# Patient Record
Sex: FEMALE | Race: WHITE | NOT HISPANIC OR LATINO | Employment: FULL TIME | URBAN - METROPOLITAN AREA
[De-identification: names, ages, dates, MRNs, and addresses within clinical notes are randomized per-mention and may not be internally consistent; named-entity substitution may affect disease eponyms.]

---

## 2021-04-27 ENCOUNTER — OFFICE VISIT (OUTPATIENT)
Dept: FAMILY MEDICINE CLINIC | Facility: CLINIC | Age: 21
End: 2021-04-27
Payer: COMMERCIAL

## 2021-04-27 VITALS
BODY MASS INDEX: 33.03 KG/M2 | HEART RATE: 88 BPM | OXYGEN SATURATION: 98 % | SYSTOLIC BLOOD PRESSURE: 144 MMHG | TEMPERATURE: 97.9 F | DIASTOLIC BLOOD PRESSURE: 70 MMHG | HEIGHT: 66 IN | WEIGHT: 205.5 LBS

## 2021-04-27 DIAGNOSIS — S13.9XXA NECK SPRAIN, INITIAL ENCOUNTER: ICD-10-CM

## 2021-04-27 DIAGNOSIS — Z00.00 ANNUAL PHYSICAL EXAM: Primary | ICD-10-CM

## 2021-04-27 PROBLEM — L71.9 ROSACEA: Status: ACTIVE | Noted: 2021-04-27

## 2021-04-27 PROBLEM — Z86.16 HISTORY OF COVID-19: Status: ACTIVE | Noted: 2021-04-27

## 2021-04-27 PROBLEM — J45.30 MILD PERSISTENT ASTHMA WITHOUT COMPLICATION: Status: ACTIVE | Noted: 2021-04-27

## 2021-04-27 PROBLEM — Z30.41 ORAL CONTRACEPTIVE PILL SURVEILLANCE: Status: ACTIVE | Noted: 2021-04-27

## 2021-04-27 PROBLEM — F41.9 ANXIETY: Status: ACTIVE | Noted: 2021-04-27

## 2021-04-27 PROBLEM — Z98.890 HISTORY OF BILATERAL BREAST REDUCTION SURGERY: Status: ACTIVE | Noted: 2021-04-27

## 2021-04-27 PROBLEM — Z98.890 HISTORY OF SINUS SURGERY: Status: ACTIVE | Noted: 2021-04-27

## 2021-04-27 PROBLEM — J45.909 ASTHMA: Status: ACTIVE | Noted: 2021-04-27

## 2021-04-27 PROBLEM — L71.9 ROSACEA: Status: RESOLVED | Noted: 2021-04-27 | Resolved: 2021-04-27

## 2021-04-27 PROCEDURE — 99385 PREV VISIT NEW AGE 18-39: CPT | Performed by: FAMILY MEDICINE

## 2021-04-27 PROCEDURE — 3725F SCREEN DEPRESSION PERFORMED: CPT | Performed by: FAMILY MEDICINE

## 2021-04-27 PROCEDURE — 3008F BODY MASS INDEX DOCD: CPT | Performed by: FAMILY MEDICINE

## 2021-04-27 PROCEDURE — 1036F TOBACCO NON-USER: CPT | Performed by: FAMILY MEDICINE

## 2021-04-27 RX ORDER — SULFAMETHOXAZOLE AND TRIMETHOPRIM 800; 160 MG/1; MG/1
TABLET ORAL
COMMUNITY
Start: 2021-03-23 | End: 2021-04-27

## 2021-04-27 RX ORDER — CEPHALEXIN 500 MG/1
500 CAPSULE ORAL 4 TIMES DAILY
COMMUNITY
Start: 2021-03-29 | End: 2021-04-27

## 2021-04-27 RX ORDER — NORETHINDRONE ACETATE AND ETHINYL ESTRADIOL, AND FERROUS FUMARATE 1MG-20(24)
KIT ORAL DAILY
COMMUNITY
Start: 2021-03-23

## 2021-04-27 RX ORDER — FLUCONAZOLE 150 MG/1
TABLET ORAL
COMMUNITY
Start: 2021-03-29 | End: 2021-04-27

## 2021-04-27 RX ORDER — PROPRANOLOL HYDROCHLORIDE 20 MG/1
20 TABLET ORAL 2 TIMES DAILY
COMMUNITY
Start: 2021-03-26 | End: 2021-04-27

## 2021-04-27 RX ORDER — TOPIRAMATE 25 MG/1
TABLET ORAL
COMMUNITY
Start: 2021-03-26 | End: 2021-04-27

## 2021-04-27 RX ORDER — NORETHINDRONE ACETATE AND ETHINYL ESTRADIOL, AND FERROUS FUMARATE 1MG-20(24)
KIT ORAL
COMMUNITY
End: 2021-04-27

## 2021-04-27 RX ORDER — ALBUTEROL SULFATE 90 UG/1
2 AEROSOL, METERED RESPIRATORY (INHALATION)
COMMUNITY

## 2021-04-27 NOTE — PROGRESS NOTES
[facility] PRACTICE    NAME: Parviz Dockery  AGE: 24 y o  SEX: female  : 2000     DATE: 2021     Assessment and Plan:     Problem List Items Addressed This Visit     None      Visit Diagnoses     Annual physical exam    -  Primary    Neck sprain, initial encounter        Relevant Orders    Ambulatory referral to Physical Therapy          Immunizations and preventive care screenings were discussed with patient today  Appropriate education was printed on patient's after visit summary  Counseling:  Dental Health: discussed importance of regular tooth brushing, flossing, and dental visits  · Exercise: the importance of regular exercise/physical activity was discussed  Recommend exercise 3-5 times per week for at least 30 minutes  BMI Counseling: Body mass index is 32 92 kg/m²  The BMI is above normal  Exercise recommendations include exercising 3-5 times per week  Return in 1 year (on 2022)  Chief Complaint:     Chief Complaint   Patient presents with    Annual Exam     pt here physical      History of Present Illness:     Adult Annual Physical   Patient here for a comprehensive physical exam  The patient reports no problems  Diet and Physical Activity  · Diet/Nutrition: well balanced diet  · Exercise: walking  Depression Screening  PHQ-9 Depression Screening    PHQ-9:   Frequency of the following problems over the past two weeks:      Little interest or pleasure in doing things: 0 - not at all  Feeling down, depressed, or hopeless: 0 - not at all  PHQ-2 Score: 0       General Health  · Sleep: sleeps well  · Hearing: normal - bilateral   · Vision: no vision problems  · Dental: regular dental visits  /GYN Health  · Contraceptive method: oral contraceptives  Does not really get periods  Review of Systems:     Review of Systems   Constitutional: Positive for fatigue   Negative for chills, fever and unexpected weight change  HENT: Positive for congestion  Negative for ear pain, hearing loss, postnasal drip, rhinorrhea and sore throat  Eyes: Negative for itching and visual disturbance  Respiratory: Negative for cough and shortness of breath  Some SOB   Cardiovascular: Negative for chest pain  Gastrointestinal: Negative for abdominal pain, constipation and diarrhea  Genitourinary: Negative for difficulty urinating  Musculoskeletal: Positive for myalgias (neck pain)  Negative for arthralgias and gait problem  Skin: Negative for rash  Neurological: Negative for light-headedness and headaches (has migraines, related to neck muscle spasm)  Psychiatric/Behavioral: Negative for dysphoric mood and sleep disturbance  The patient is nervous/anxious         Past Medical History:     Past Medical History:   Diagnosis Date    Anxiety     Asthma     Depression     Sinusitis       Past Surgical History:     Past Surgical History:   Procedure Laterality Date    BREAST SURGERY      SINUS SURGERY        Social History:        Social History     Socioeconomic History    Marital status: Single     Spouse name: None    Number of children: None    Years of education: None    Highest education level: None   Occupational History    None   Social Needs    Financial resource strain: None    Food insecurity     Worry: None     Inability: None    Transportation needs     Medical: None     Non-medical: None   Tobacco Use    Smoking status: Never Smoker    Smokeless tobacco: Never Used   Substance and Sexual Activity    Alcohol use: Never     Alcohol/week: 0 0 standard drinks     Frequency: Never     Binge frequency: Never    Drug use: Never    Sexual activity: None   Lifestyle    Physical activity     Days per week: None     Minutes per session: None    Stress: None   Relationships    Social connections     Talks on phone: None     Gets together: None     Attends Spiritism service: None     Active member of club or organization: None     Attends meetings of clubs or organizations: None     Relationship status: None    Intimate partner violence     Fear of current or ex partner: None     Emotionally abused: None     Physically abused: None     Forced sexual activity: None   Other Topics Concern    None   Social History Narrative    None      Family History:     Family History   Problem Relation Age of Onset    Asthma Mother     Cancer Mother     Asthma Maternal Grandmother     Cancer Maternal Grandfather       Current Medications:     Current Outpatient Medications   Medication Sig Dispense Refill    albuterol (PROVENTIL HFA,VENTOLIN HFA) 90 mcg/act inhaler Inhale 2 puffs      Fluticasone Furoate-Vilanterol (BREO ELLIPTA IN) Inhale      Norethin Ace-Eth Estrad-FE 1-20 MG-MCG(24) CAPS Take by mouth daily       No current facility-administered medications for this visit  Allergies:     No Known Allergies   Physical Exam:     /70   Pulse 88   Temp 97 9 °F (36 6 °C)   Ht 5' 6 25" (1 683 m)   Wt 93 2 kg (205 lb 8 oz)   SpO2 98%   BMI 32 92 kg/m²     Physical Exam  Constitutional:       Appearance: She is well-developed  HENT:      Head: Normocephalic and atraumatic  Right Ear: Tympanic membrane, ear canal and external ear normal       Left Ear: Tympanic membrane, ear canal and external ear normal    Eyes:      Conjunctiva/sclera: Conjunctivae normal    Neck:      Musculoskeletal: Normal range of motion and neck supple  Cardiovascular:      Rate and Rhythm: Normal rate and regular rhythm  Heart sounds: Normal heart sounds  No murmur  Pulmonary:      Effort: Pulmonary effort is normal  No respiratory distress  Breath sounds: Normal breath sounds  Abdominal:      General: Bowel sounds are normal  There is no distension  Palpations: Abdomen is soft  Tenderness: There is no abdominal tenderness  Musculoskeletal: Normal range of motion  Skin:     General: Skin is warm and dry  Findings: No rash  Neurological:      General: No focal deficit present  Mental Status: She is alert and oriented to person, place, and time  Psychiatric:         Mood and Affect: Mood normal          Behavior: Behavior normal          Thought Content:  Thought content normal          Judgment: Judgment normal           MD Aureliano Simeon

## 2021-04-27 NOTE — PATIENT INSTRUCTIONS

## 2021-06-01 ENCOUNTER — TELEPHONE (OUTPATIENT)
Dept: ADMINISTRATIVE | Facility: OTHER | Age: 21
End: 2021-06-01

## 2021-06-01 ENCOUNTER — OFFICE VISIT (OUTPATIENT)
Dept: FAMILY MEDICINE CLINIC | Facility: CLINIC | Age: 21
End: 2021-06-01
Payer: COMMERCIAL

## 2021-06-01 VITALS
DIASTOLIC BLOOD PRESSURE: 74 MMHG | SYSTOLIC BLOOD PRESSURE: 138 MMHG | OXYGEN SATURATION: 97 % | WEIGHT: 200.5 LBS | HEIGHT: 66 IN | HEART RATE: 78 BPM | TEMPERATURE: 99.1 F | BODY MASS INDEX: 32.22 KG/M2

## 2021-06-01 DIAGNOSIS — R10.2 PELVIC PAIN IN FEMALE: Primary | ICD-10-CM

## 2021-06-01 DIAGNOSIS — J45.30 MILD PERSISTENT ASTHMA WITHOUT COMPLICATION: ICD-10-CM

## 2021-06-01 DIAGNOSIS — N94.6 DYSMENORRHEA: ICD-10-CM

## 2021-06-01 PROCEDURE — 1036F TOBACCO NON-USER: CPT | Performed by: FAMILY MEDICINE

## 2021-06-01 PROCEDURE — 99213 OFFICE O/P EST LOW 20 MIN: CPT | Performed by: FAMILY MEDICINE

## 2021-06-01 PROCEDURE — 3008F BODY MASS INDEX DOCD: CPT | Performed by: FAMILY MEDICINE

## 2021-06-01 NOTE — LETTER
Procedure Request Form: Cervical Cancer Screening      Date Requested: 21  Patient: Karen Brown  Patient : 2000   Referring Provider: Aaron Hernández, MD        Date of Procedure ___most recent pap report___________________________       The above patient has informed us that they have completed their   most recent Cervical Cancer Screening at your facility  Please complete   this form and attach all corresponding procedure reports/results  Comments __________________________________________________________  ____________________________________________________________________  ____________________________________________________________________  ____________________________________________________________________    Facility Completing Procedure _________________________________________    Form Completed By (print name) _______________________________________      Signature __________________________________________________________      These reports are needed for  compliance    Please fax this completed form and a copy of the procedure report to our office located at [place] [address] as soon as possible to [phone] [facility] [place]: Phone [phone]    We thank you for your assistance in treating our mutual patient

## 2021-06-01 NOTE — TELEPHONE ENCOUNTER
Upon review of the In Basket request and the patient's chart, initial outreach has been made via fax, please see Contacts section for details       Thank you  Lois Lowe MA     [facility] [phone] Fax [phone]

## 2021-06-01 NOTE — TELEPHONE ENCOUNTER
----- Message from Stefanie Gusman MA sent at 6/1/2021  9:26 AM EDT -----  Regarding: care gap request  06/01/21 9:26 AM    Hello, our patient attached above has had Pap Smear (HPV) aka Cervical Cancer Screening completed/performed  Please assist in updating the patient chart by making an External outreach to  [facility] located in [place], [place]  The date of service is not known to pt, but she feels she is up to date      Thank you,  Stefanie Gusman MA  [facility]

## 2021-06-01 NOTE — ASSESSMENT & PLAN NOTE
Used inhaler more during covid 19 infection but now has not needed inhaler   Breathing seems back to normal

## 2021-06-01 NOTE — PROGRESS NOTES
Assessment/Plan:      1  Pelvic pain in female  Assessment & Plan:  Schedule ultrasound pelvis, r/o ovarian cyst r  Pt on bcp  No changes in bms     Orders:  -     US pelvis complete non OB; Future; Expected date: 06/01/2021    2  Dysmenorrhea  Assessment & Plan:  Continue bcp      3  Mild persistent asthma without complication  Assessment & Plan:  Used inhaler more during covid 19 infection but now has not needed inhaler  Breathing seems back to normal           Subjective:  Chief Complaint   Patient presents with    Pelvic Pain     Ongoing pain in pelvic area across lower abd for the past month  Saw Everett Valera about a month ago and went to the [address] for her pain about 3 weeks ago  Dr Elvis Duff said she had a bladder infection and gave her atbs  She still had the symptoms and then developed back pain  Went to [facility] urgent care and they did urine samples  Found nothing in her urine, but gave her an atb  Pt states she is still experiencing the pain  Periods are very irregular-1-2 times/year  Her cramps have been bad  Patient ID: Chuck Spaulding is a 24 y o  female  Pt states pain ongoing in lower abdomen for 1 month  First seen by gyn, and had a uti and treated antibiotics bactrim and resolved, but pain continued  Pain intermittent  Getting more constant  Worse in am  Ibuprofen helps somewhat with the pain  No fever  Nausea, no vomiting, bms are normal   [facility]- tested urine and blood work  Seen at ER and given a second antibiotic- cephalexin  On bcp - getting periods twice a year  Had last 3 weeks ago  Cramping was bad,   Had pap done with gyn and was normal   Also has a torn labrum on the right hip      Review of Systems   Constitutional: Negative  Negative for fatigue and fever  HENT: Negative  Eyes: Negative  Respiratory: Negative  Negative for cough  Cardiovascular: Negative  Gastrointestinal: Negative  Endocrine: Negative      Genitourinary: Positive for pelvic pain  Negative for decreased urine volume, dysuria, flank pain, frequency, hematuria, menstrual problem, urgency, vaginal bleeding, vaginal discharge and vaginal pain  Musculoskeletal: Positive for arthralgias  Right hip pain   Skin: Negative  Allergic/Immunologic: Negative  Neurological: Negative  Psychiatric/Behavioral: Negative  The following portions of the patient's history were reviewed and updated as appropriate: allergies, current medications, past family history, past medical history, past social history, past surgical history and problem list     Objective:  Vitals:    06/01/21 0916   BP: 138/74   BP Location: Left arm   Patient Position: Sitting   Cuff Size: Large   Pulse: 78   Temp: 99 1 °F (37 3 °C)   TempSrc: Tympanic   SpO2: 97%   Weight: 90 9 kg (200 lb 8 oz)   Height: 5' 6 25" (1 683 m)      Physical Exam  Vitals signs and nursing note reviewed  Constitutional:       Appearance: She is well-developed  HENT:      Head: Normocephalic and atraumatic  Cardiovascular:      Rate and Rhythm: Normal rate and regular rhythm  Heart sounds: Normal heart sounds  Pulmonary:      Effort: Pulmonary effort is normal       Breath sounds: Normal breath sounds  Abdominal:      General: Bowel sounds are normal       Palpations: Abdomen is soft  Comments: Right pelvic pain greater than left    Musculoskeletal:         General: Tenderness present  Comments: Tenderness over right greater trochanter   Skin:     General: Skin is warm and dry  Neurological:      Mental Status: She is alert and oriented to person, place, and time  Psychiatric:         Behavior: Behavior normal          Thought Content:  Thought content normal          Judgment: Judgment normal

## 2021-06-02 NOTE — TELEPHONE ENCOUNTER
Upon review of the In Basket request we were able to locate, review, and update the patient chart as requested for Pap Smear (HPV) aka Cervical Cancer Screening  Any additional questions or concerns should be emailed to the Practice Liaisons via [email or web address] email, please do not reply via In Basket      Thank you  Dawn Yoder

## 2021-06-03 ENCOUNTER — HOSPITAL ENCOUNTER (OUTPATIENT)
Dept: ULTRASOUND IMAGING | Facility: HOSPITAL | Age: 21
Discharge: HOME | End: 2021-06-03
Payer: COMMERCIAL

## 2021-06-03 DIAGNOSIS — R10.2 PELVIC PAIN IN FEMALE: ICD-10-CM

## 2021-06-03 PROCEDURE — 76856 US EXAM PELVIC COMPLETE: CPT

## 2021-06-03 PROCEDURE — 76830 TRANSVAGINAL US NON-OB: CPT

## 2021-06-10 ENCOUNTER — TELEPHONE (OUTPATIENT)
Dept: FAMILY MEDICINE CLINIC | Facility: CLINIC | Age: 21
End: 2021-06-10

## 2021-06-10 NOTE — TELEPHONE ENCOUNTER
----- Message from Jared Daniels DO sent at 6/10/2021 12:16 AM EDT -----  YOUR ULTRASOUND IS NORMAL  PLEASE CONTACT US IF YOU ARE HAVING ONGOING ISSUES  SENT THROUGH [facility]

## 2021-06-11 DIAGNOSIS — R10.30 LOWER ABDOMINAL PAIN: Primary | ICD-10-CM

## 2021-06-11 NOTE — TELEPHONE ENCOUNTER
She can call to schedule pelvic ct scan pelvis with contrast to check for problems related to the colon  She will need a prior authorization for [facility]stone, but have her schedule

## 2021-06-23 ENCOUNTER — HOSPITAL ENCOUNTER (OUTPATIENT)
Dept: CT IMAGING | Facility: HOSPITAL | Age: 21
Discharge: HOME | End: 2021-06-23
Payer: COMMERCIAL

## 2021-06-23 DIAGNOSIS — R10.30 LOWER ABDOMINAL PAIN: ICD-10-CM

## 2021-06-23 PROCEDURE — G1004 CDSM NDSC: HCPCS

## 2021-06-23 PROCEDURE — 72192 CT PELVIS W/O DYE: CPT

## 2021-06-24 ENCOUNTER — TELEPHONE (OUTPATIENT)
Dept: FAMILY MEDICINE CLINIC | Facility: CLINIC | Age: 21
End: 2021-06-24

## 2021-06-25 ENCOUNTER — TELEPHONE (OUTPATIENT)
Dept: FAMILY MEDICINE CLINIC | Facility: CLINIC | Age: 21
End: 2021-06-25

## 2022-11-11 ENCOUNTER — OFFICE VISIT (OUTPATIENT)
Dept: FAMILY MEDICINE CLINIC | Facility: CLINIC | Age: 22
End: 2022-11-11

## 2022-11-11 VITALS
BODY MASS INDEX: 33.91 KG/M2 | HEART RATE: 80 BPM | TEMPERATURE: 96.8 F | OXYGEN SATURATION: 99 % | DIASTOLIC BLOOD PRESSURE: 70 MMHG | SYSTOLIC BLOOD PRESSURE: 120 MMHG | HEIGHT: 66 IN | WEIGHT: 211 LBS

## 2022-11-11 DIAGNOSIS — L70.9 ACNE, UNSPECIFIED ACNE TYPE: ICD-10-CM

## 2022-11-11 DIAGNOSIS — Z13.29 SCREENING FOR THYROID DISORDER: ICD-10-CM

## 2022-11-11 DIAGNOSIS — E66.9 CLASS 1 OBESITY WITHOUT SERIOUS COMORBIDITY WITH BODY MASS INDEX (BMI) OF 34.0 TO 34.9 IN ADULT, UNSPECIFIED OBESITY TYPE: ICD-10-CM

## 2022-11-11 DIAGNOSIS — Z13.220 SCREENING CHOLESTEROL LEVEL: ICD-10-CM

## 2022-11-11 DIAGNOSIS — R53.83 OTHER FATIGUE: ICD-10-CM

## 2022-11-11 DIAGNOSIS — Z13.1 SCREENING FOR DIABETES MELLITUS: ICD-10-CM

## 2022-11-11 DIAGNOSIS — E34.9 HORMONE IMBALANCE: ICD-10-CM

## 2022-11-11 DIAGNOSIS — Z00.00 ANNUAL PHYSICAL EXAM: Primary | ICD-10-CM

## 2022-11-11 DIAGNOSIS — N94.6 DYSMENORRHEA: ICD-10-CM

## 2022-11-11 DIAGNOSIS — Z13.0 SCREENING, ANEMIA, DEFICIENCY, IRON: ICD-10-CM

## 2022-11-11 DIAGNOSIS — R14.0 ABDOMINAL BLOATING: ICD-10-CM

## 2022-11-11 DIAGNOSIS — J45.30 MILD PERSISTENT ASTHMA WITHOUT COMPLICATION: ICD-10-CM

## 2022-11-11 RX ORDER — NORETHINDRONE ACETATE AND ETHINYL ESTRADIOL .03; 1.5 MG/1; MG/1
TABLET ORAL
COMMUNITY

## 2022-11-11 NOTE — PROGRESS NOTES
[address] [place] [address] PRACTICE    NAME: Rossy Slade  AGE: 20 y o   SEX: female  : 2000     DATE: 2022     Assessment and Plan:     Problem List Items Addressed This Visit        Endocrine    Hormone imbalance     Check labs  Keep new appointment with GYN  Discussed endocrine evaluation- referral placed         Relevant Orders    Sex Hormone Binding Globulin    DHEA-sulfate    Estrogens, total    Prolactin    FSH and LH    Cortisol Level, AM Specimen    TSH, 3rd generation    Lipid panel    Comprehensive metabolic panel    T4, free    CBC and differential    Celiac Antibodies Profile    Ambulatory Referral to Endocrinology       Respiratory    Mild persistent asthma without complication     Stable continue breo and albuterol prn              Musculoskeletal and Integument    Acne     Continue dermatology appointments         Relevant Orders    Sex Hormone Binding Globulin    DHEA-sulfate    Estrogens, total    Prolactin    FSH and LH    Cortisol Level, AM Specimen    TSH, 3rd generation    Lipid panel    Comprehensive metabolic panel    T4, free    CBC and differential    Celiac Antibodies Profile    Ambulatory Referral to Endocrinology       Genitourinary    Dysmenorrhea      Continue birth control  Keep appointment with GYN  Check labs  Referral made for endocrinology evaluation         Relevant Orders    Sex Hormone Binding Globulin    DHEA-sulfate    Estrogens, total    Prolactin    FSH and LH    Cortisol Level, AM Specimen    TSH, 3rd generation    Lipid panel    Comprehensive metabolic panel    T4, free    CBC and differential    Celiac Antibodies Profile    Ambulatory Referral to Endocrinology       Other    Other fatigue    Relevant Orders    Sex Hormone Binding Globulin    DHEA-sulfate    Estrogens, total    Prolactin    FSH and LH    Cortisol Level, AM Specimen    TSH, 3rd generation    Lipid panel    Comprehensive metabolic panel T4, free    CBC and differential    Celiac Antibodies Profile    Ambulatory Referral to Endocrinology    Abdominal bloating    Relevant Orders    Sex Hormone Binding Globulin    DHEA-sulfate    Estrogens, total    Prolactin    FSH and LH    Cortisol Level, AM Specimen    TSH, 3rd generation    Lipid panel    Comprehensive metabolic panel    T4, free    CBC and differential    Celiac Antibodies Profile    Ambulatory Referral to Endocrinology      Other Visit Diagnoses     Annual physical exam    -  Primary    Class 1 obesity without serious comorbidity with body mass index (BMI) of 34 0 to 34 9 in adult, unspecified obesity type        Relevant Orders    Sex Hormone Binding Globulin    DHEA-sulfate    Estrogens, total    Prolactin    FSH and LH    Cortisol Level, AM Specimen    TSH, 3rd generation    Lipid panel    Comprehensive metabolic panel    T4, free    CBC and differential    Celiac Antibodies Profile    Ambulatory Referral to Endocrinology    Screening for thyroid disorder        Relevant Orders    TSH, 3rd generation    T4, free    Screening cholesterol level        Relevant Orders    Lipid panel    Screening, anemia, deficiency, iron        Relevant Orders    CBC and differential    Screening for diabetes mellitus        Relevant Orders    Comprehensive metabolic panel          Immunizations and preventive care screenings were discussed with patient today  Appropriate education was printed on patient's after visit summary  Counseling:  Alcohol/drug use: discussed moderation in alcohol intake, the recommendations for healthy alcohol use, and avoidance of illicit drug use  Dental Health: discussed importance of regular tooth brushing, flossing, and dental visits  Injury prevention: discussed safety/seat belts, safety helmets, smoke detectors, carbon dioxide detectors, and smoking near bedding or upholstery    Sexual health: discussed sexually transmitted diseases, partner selection, use of condoms, avoidance of unintended pregnancy, and contraceptive alternatives  · Exercise: the importance of regular exercise/physical activity was discussed  Recommend exercise 3-5 times per week for at least 30 minutes  BMI Counseling: Body mass index is 34 06 kg/m²  The BMI is above normal  Nutrition recommendations include decreasing portion sizes, consuming healthier snacks, limiting drinks that contain sugar, moderation in carbohydrate intake, reducing intake of saturated and trans fat and reducing intake of cholesterol  Exercise recommendations include exercising 3-5 times per week and strength training exercises  Rationale for BMI follow-up plan is due to patient being overweight or obese  Depression Screening and Follow-up Plan: Patient was screened for depression during today's encounter  They screened negative with a PHQ-2 score of 0  No follow-ups on file  Chief Complaint:     Chief Complaint   Patient presents with   • Physical Exam     PCOS       History of Present Illness:     Adult Annual Physical   Patient here for a comprehensive physical exam  The patient reports problems - hormone issues  Follows with GYN- seeing a new one in December  Has tried multiple different birth controls- pharmacy changes based off generics  Feels her hormones are off and was most recently put on Junel for 7 months  Doesn't get breakthrough bleeding on pill pack  Started menses today 11/11/2022  Believes she started her menses around age 7 or 5  Waking up with night sweats, even with two fans, feels she is constantly hot, feels abdominal bloating even with dietary changes and avoidance of certain triggers, feels mood swings  Just doesn't feel right  Prior to sinus surgery had CT scan that showed abnormality in pituitary gland- had MRI done showed Prominent symmetric dome-shaped homogeneously enhancing pituitary gland which abuts the optic chiasm, measuring 10 mm in vertical height   The differential diagnosis favors pituitary hyperplasia rather than a pituitary macroadenoma or lymphocytic hypophysitis  Had prolactin levels, thyroid workup and cortisol levels check 8/2020- work up negative  She has difficulty losing weight  Denies abnormal/excessive hair growth  Struggling with acne even on birth controls- following with dermatology, recommended getting tested/worked up for PCOS    Diet and Physical Activity  · Diet/Nutrition: tries to eat healthy  · Exercise: walking  Depression Screening  PHQ-2/9 Depression Screening    Little interest or pleasure in doing things: 0 - not at all  Feeling down, depressed, or hopeless: 0 - not at all  PHQ-2 Score: 0  PHQ-2 Interpretation: Negative depression screen       General Health  · Sleep: sleeps well  · Hearing: normal - bilateral   · Vision: no vision problems  · Dental: regular dental visits  /GYN Health  · Last menstrual period: 11/11/2022  · Contraceptive method: oral contraceptives  · History of STDs?: no      Review of Systems:     Review of Systems   Constitutional: Positive for fatigue  HENT: Negative  Eyes: Negative  Respiratory: Negative  Cardiovascular: Negative  Gastrointestinal: Positive for abdominal distention  Negative for abdominal pain, constipation, diarrhea, nausea and vomiting  Endocrine: Positive for heat intolerance  Genitourinary: Positive for menstrual problem  Negative for hematuria, pelvic pain, vaginal bleeding, vaginal discharge and vaginal pain  Neurological: Negative  Hematological: Negative  Psychiatric/Behavioral: Negative         Past Medical History:     Past Medical History:   Diagnosis Date   • Anxiety    • Asthma    • Depression    • Sinusitis       Past Surgical History:     Past Surgical History:   Procedure Laterality Date   • BREAST SURGERY     • SINUS SURGERY        Social History:     Social History     Socioeconomic History   • Marital status: Single     Spouse name: None   • Number of children: None   • Years of education: None   • Highest education level: None   Occupational History   • None   Tobacco Use   • Smoking status: Never Smoker   • Smokeless tobacco: Never Used   Vaping Use   • Vaping Use: Never used   Substance and Sexual Activity   • Alcohol use: Never     Alcohol/week: 0 0 standard drinks   • Drug use: Never   • Sexual activity: None   Other Topics Concern   • None   Social History Narrative   • None     Social Determinants of Health     Financial Resource Strain: Not on file   Food Insecurity: Not on file   Transportation Needs: Not on file   Physical Activity: Not on file   Stress: Not on file   Social Connections: Not on file   Intimate Partner Violence: Not on file   Housing Stability: Not on file      Family History:     Family History   Problem Relation Age of Onset   • Asthma Mother    • Cancer Mother         cervical   • Asthma Maternal Grandmother    • Cancer Maternal Grandfather       Current Medications:     Current Outpatient Medications   Medication Sig Dispense Refill   • albuterol (PROVENTIL HFA,VENTOLIN HFA) 90 mcg/act inhaler Inhale 2 puffs     • Fluticasone Furoate-Vilanterol (BREO ELLIPTA IN) Inhale     • Norethindrone Acet-Ethinyl Est 1 5-30 MG-MCG TABS        No current facility-administered medications for this visit  Allergies:     No Known Allergies   Physical Exam:     /70   Pulse 80   Temp (!) 96 8 °F (36 °C) (Tympanic)   Ht 5' 6" (1 676 m)   Wt 95 7 kg (211 lb)   LMP 11/11/2022   SpO2 99%   BMI 34 06 kg/m²     Physical Exam  Vitals and nursing note reviewed  Constitutional:       General: She is not in acute distress  Appearance: Normal appearance  She is well-developed  She is obese  HENT:      Head: Normocephalic and atraumatic        Right Ear: Tympanic membrane, ear canal and external ear normal       Left Ear: Tympanic membrane, ear canal and external ear normal       Nose: Nose normal       Mouth/Throat:      Mouth: Mucous membranes are moist       Pharynx: Oropharynx is clear  Eyes:      Conjunctiva/sclera: Conjunctivae normal       Pupils: Pupils are equal, round, and reactive to light  Cardiovascular:      Rate and Rhythm: Normal rate and regular rhythm  Heart sounds: Normal heart sounds  No murmur heard  Pulmonary:      Effort: Pulmonary effort is normal  No respiratory distress  Breath sounds: Normal breath sounds  Abdominal:      General: Bowel sounds are normal       Palpations: Abdomen is soft  Tenderness: There is no abdominal tenderness  Musculoskeletal:      Cervical back: Neck supple  No tenderness  Right lower leg: No edema  Left lower leg: No edema  Lymphadenopathy:      Cervical: No cervical adenopathy  Skin:     General: Skin is warm and dry  Neurological:      Mental Status: She is alert and oriented to person, place, and time  Psychiatric:         Mood and Affect: Mood normal          Behavior: Behavior normal          Thought Content:  Thought content normal          Judgment: Judgment normal           Samira Ortega, [address] [facility]

## 2022-11-11 NOTE — PATIENT INSTRUCTIONS

## 2022-11-11 NOTE — ASSESSMENT & PLAN NOTE
Continue birth control  Keep appointment with GYN  Check labs  Referral made for endocrinology evaluation

## 2022-11-13 PROBLEM — L70.9 ACNE: Status: ACTIVE | Noted: 2022-11-13

## 2022-11-13 PROBLEM — E34.9 HORMONE IMBALANCE: Status: ACTIVE | Noted: 2022-11-13

## 2022-11-13 PROBLEM — R14.0 ABDOMINAL BLOATING: Status: ACTIVE | Noted: 2022-11-13

## 2022-11-13 PROBLEM — R53.83 OTHER FATIGUE: Status: ACTIVE | Noted: 2022-11-13

## 2022-11-29 ENCOUNTER — CONSULT (OUTPATIENT)
Dept: ENDOCRINOLOGY | Facility: HOSPITAL | Age: 22
End: 2022-11-29

## 2022-11-29 VITALS
HEIGHT: 66 IN | DIASTOLIC BLOOD PRESSURE: 70 MMHG | HEART RATE: 67 BPM | WEIGHT: 212 LBS | SYSTOLIC BLOOD PRESSURE: 126 MMHG | BODY MASS INDEX: 34.07 KG/M2

## 2022-11-29 DIAGNOSIS — E34.9 HORMONE IMBALANCE: ICD-10-CM

## 2022-11-29 DIAGNOSIS — R14.0 ABDOMINAL BLOATING: ICD-10-CM

## 2022-11-29 DIAGNOSIS — R53.83 OTHER FATIGUE: ICD-10-CM

## 2022-11-29 DIAGNOSIS — E66.9 CLASS 1 OBESITY WITHOUT SERIOUS COMORBIDITY WITH BODY MASS INDEX (BMI) OF 34.0 TO 34.9 IN ADULT, UNSPECIFIED OBESITY TYPE: ICD-10-CM

## 2022-11-29 DIAGNOSIS — E23.6 HYPERPLASIA OF ANTERIOR PITUITARY (HCC): Primary | ICD-10-CM

## 2022-11-29 DIAGNOSIS — N94.6 DYSMENORRHEA: ICD-10-CM

## 2022-11-29 NOTE — PROGRESS NOTES
11/29/2022    Assessment/Plan        Problem List Items Addressed This Visit        Endocrine    Hormone imbalance       Genitourinary    Dysmenorrhea    Relevant Orders    TSH, 3rd generation with Free T4 reflex       Other    Other fatigue    Relevant Orders    TSH, 3rd generation with Free T4 reflex    Abdominal bloating   Other Visit Diagnoses     Hyperplasia of anterior pituitary ([address] )    -  Primary    Relevant Orders    ACTH- Lab Collect    Insulin-like growth factor 1 (IGF-1) - Lab Collect    Class 1 obesity without serious comorbidity with body mass index (BMI) of 34 0 to 34 9 in adult, unspecified obesity type              Assessment/Plan:   22yF with Pituitary hyperplasia incidentally noted on imaging in 2020 1cm without any discrete mass/stalk hyperplasia with ? Abutting optic chiasm who presents today to discuss her pituitary MRI and also with concern for weight gain, abnormal menses and hyperhidrosis  We discussed that pituitary hyperplasia can be d/t secondary signal d/t primary insufficiency from other hormones vs could also be part of normal pituitary standing and simply a radiological data which does not translate to any clinical pathology  Patient had her cortisol checked- normal  TSH was borderline high at 5 4 (ref range 5 0) with normal Free T4 indicating subclinical hypothyroidism which typically should NOT cause pituitary hyperplasia  However given symptoms of weight gain, fatigue recommended to monitor this for now in 3 months and if still elevated can consider low dose levothyroxine trial    LH/FSH/Estradiol levels unreliable since patient on COCP  Recommended can repeat this access once off COCP for 3-6 months however patient not comfortably currently getting off contraceptive  Prolactin was also normal  Did recommend testing for acromegaly specially since reporting symptoms of weight gain, hyperhidrosis despite clinical suspicion for acromegaly low  Will check IGF-1 level   Will also check ACTH for complete ant pit panel- although less likely an issue given normal cortisol level  We discussed that her menses could be abnormal while on COCP with some women not having regular menses when on Combined OCP  Regular menses at start of menarche with appropriate secondary sexual features makes secondary hypogonadism not likely  Would discuss with her Ob/Gyn if considering trial of a different type of COCP to see if menstruation cycle could be more regular with that  Does not appear to clinically have PCOS to obtain further workup  Alternative reasons for mid night sweating could also include night rivera, stress etc  Weigh gain could be multi factorial as well  Did recommend trying low calorie deficit diet and 43303 steps per day for starters  Also discussed does not need repeat Pit imaging for now unless has evidence of any worsening cranial symptoms which cannot be attributed to her migraine HA or frequency of her HA worsens  She will reach out to me  Will trend Ant Pit hormone for 1 more year and if normal every 2-3 years for a few years  Also given ? abutting chiasm reported on MRI in 2020, recommend ophthalmology eval for VFT  RTC in 3-4 months for repeat Thyroid check  CC: Abnormal menses and Pituitary problem    History of Present Illness     HPI: Sariah Hutchinson is a 20y o  year old female with history of pituitary hyperplasia notes on CT head done in 07/2020 as part of workup for her sinus surgery  This was an incidental finding  She subsequently had MRI brain 07/2020 which showed that her pitutary gland was enlarged 1cm  Which abuts the optic chiasm without any adenoma/hypophisitis  Repeat MRI in 02/2021 showed stable pituitary hyperplasia with minimal mass effect on the pit stalk, reported as appropriate for her age group  Images[facility]workup [facility]       Patient now presents for endocrine evaluation given known pituitary history and also having issues with abnormal menses  Menstruation hx:- Started in 6th grade  Was having regular but menorrhagia/cramps and therefore started on birth control  Reports no consistent pattern with menses since  Can go without periods for months and then some months have periods twice  Remains on birth control currently  See's her Ob/Gyn for this as well, appointment this week to discuss this further  Currently on Norethindrone-EE 1 5-30 mg-mcg COCP  Denies having issues with hirsutism, change in voice, body odor  Other symptoms:- Also reports having excessive sweating- mainly in sleep, started 3 years ago  No pattern, comes and goes  Not associated with any specific activity or food  Mainly only at night  Also reports problem with weight, had tried several diets before but unable to lose weight despite walking 10 miles/day  Steady weight currently without any rapid gain/loss 200-220lbs for the last 2 years  Also gets bad migraine headaches- feels could be stress induced given issues with cervical knots in back  Gets blurry vision when has her migraine HA but these are not new or changes  Has had issue with this for years  Does report N/V specially when having menstrual cramps but not otherwise  She feels bloated but denies any diarrhea  Periodically is constipated when eating high carb food  Neg symptoms:- Reports no change in shoe size, no hat size change that she can report  but no swelling per say  No falls/fractures  No known history of hypertension or BG issues  No difficulty hearing, SOB, No diarrhea, no chest pain, palpitations, tremors, no difficulty swallowing or breathing, no galactorrhea, no gynecomastia  Does not believe she could be pregnant since regular with her COCP  Family Hx:- Mother just found out she has a thyroid issue  Unsure type   Social Hx:- No smoking, social alcohol use, no other drugs  Is a  at a high school for now       Review of Systems   Constitutional: Positive for diaphoresis, fatigue and unexpected weight change  Negative for activity change, appetite change and chills  HENT: Negative for trouble swallowing and voice change  Eyes: Negative for photophobia and visual disturbance  Cardiovascular: Negative for chest pain, palpitations and leg swelling  Gastrointestinal: Positive for constipation  Negative for diarrhea  Endocrine: Negative for cold intolerance, heat intolerance, polydipsia and polyuria  Genitourinary: Positive for menstrual problem  Musculoskeletal: Positive for neck stiffness  Negative for arthralgias and myalgias  Skin: Negative  Neurological: Positive for headaches  Negative for tremors, syncope, weakness, light-headedness and numbness  Historical Information   Past Medical History:   Diagnosis Date   • Anxiety    • Asthma    • Depression    • Sinusitis      Past Surgical History:   Procedure Laterality Date   • BREAST SURGERY     • SINUS SURGERY       Social History   Social History     Substance and Sexual Activity   Alcohol Use Never   • Alcohol/week: 0 0 standard drinks     Social History     Substance and Sexual Activity   Drug Use Never     Social History     Tobacco Use   Smoking Status Never   Smokeless Tobacco Never     Family History:   Family History   Problem Relation Age of Onset   • Asthma Mother    • Cancer Mother         cervical   • Asthma Maternal Grandmother    • Cancer Maternal Grandfather      Meds/Allergies   Current Outpatient Medications   Medication Sig Dispense Refill   • albuterol (PROVENTIL HFA,VENTOLIN HFA) 90 mcg/act inhaler Inhale 2 puffs     • Fluticasone Furoate-Vilanterol (BREO ELLIPTA IN) Inhale     • Norethindrone Acet-Ethinyl Est 1 5-30 MG-MCG TABS        No current facility-administered medications for this visit  No Known Allergies    Objective   Vitals: Blood pressure 126/70, pulse 67, height 5' 6" (1 676 m), weight 96 2 kg (212 lb), last menstrual period 11/11/2022    Invasive Devices None               Physical Exam  Constitutional:       Appearance: Normal appearance  She is obese  Comments: Does not appear to have frontal bosing or strong acromegalic features  Some facial swelling seen however  Teeth normal spaced  Eyes:      Extraocular Movements: Extraocular movements intact  Pupils: Pupils are equal, round, and reactive to light  Cardiovascular:      Rate and Rhythm: Normal rate and regular rhythm  Pulses: Normal pulses  Pulmonary:      Effort: Pulmonary effort is normal       Breath sounds: Normal breath sounds  Abdominal:      General: Abdomen is flat  Palpations: Abdomen is soft  Comments: No stria   Musculoskeletal:      Cervical back: Normal range of motion and neck supple  Skin:     General: Skin is warm  Capillary Refill: Capillary refill takes less than 2 seconds  Neurological:      General: No focal deficit present  Mental Status: She is alert and oriented to person, place, and time  Gait: Gait normal       Deep Tendon Reflexes: Reflexes normal    Psychiatric:         Mood and Affect: Mood normal          Behavior: Behavior normal        The history was obtained from the review of the chart and from the patient      Lab Results:       1 d ago    Sex Hormone Binding Glob 11 - 137 NMOL/L 189 High        1 d ago    DHEA (Dehydroepiandrosterone) Sulfate 45 - 430 mcg/dL 147       1 d ago    Prolactin 0 - 29 ng/mL 22        Range & Units 1 d ago Comments    FSH (Follicle-Stimulating Hormone) mIU/mL 3 4  REFERENCE RANGE FOR FEMALES:   Follicular Phase: 3 5 - 12 5   Ovulation Phase: 4 7 - 21 5   Luteal Phase: 1 7 - 7 7   Postmenopause: 25 8 - 135     Ref Range & Units 1 d ago Comments    Luteinizing hormone (LH) mIU/mL 5 5  Follicular Phase: 2 4 - 12 5   Ovulation Phase: 14 0 - 95 6   Luteal Phase: 1 0 - 11 4   Postmenopause: 7 7 - 58 5      Ref Range & Units 1 d ago   Cortisol mcg/dL 23 3      Ref Range & Units 1 d ago    TSH (Thyroid-Stimulating Hormone) 0 30 - 5 00 uIU/mL 5 33 High       Ref Range & Units 1 d ago   Free T4 0 7 - 1 7 ng/dL 1 1        US ovaries 06/2021 was normal- no ovarian cysts    MRI reports scanned in chart    Future Appointments   Date Time Provider Hany Ibrahim   3/29/2023  2:40 PM Catalina Gama MD [facility]

## 2022-12-05 ENCOUNTER — TELEPHONE (OUTPATIENT)
Dept: FAMILY MEDICINE CLINIC | Facility: CLINIC | Age: 22
End: 2022-12-05

## 2022-12-05 NOTE — TELEPHONE ENCOUNTER
Pt states that you referred her to an endocrinologist who is now recommending that she see an eye specialist  She would like to know if there is anyone that you recommend

## 2022-12-16 ENCOUNTER — TELEPHONE (OUTPATIENT)
Dept: FAMILY MEDICINE CLINIC | Facility: CLINIC | Age: 22
End: 2022-12-16

## 2022-12-16 NOTE — TELEPHONE ENCOUNTER
No other symptoms per patient , drinking enough liquids  , yes have had headaches before  But more sharp  pain in the past and able to control with Ibuprofen  pain is on the  on temple , now is on same spot temple but feel more like someone is crushing her head

## 2022-12-16 NOTE — TELEPHONE ENCOUNTER
Does she have any other symptoms? Runny or stuffy nose, ear pain, fevers? Has she ever had headaches like this before? Is she drinking enough fluids? Does the ibuprofen help? Where are the headaches located?

## 2022-12-16 NOTE — TELEPHONE ENCOUNTER
Patient looking for advise , headaches for the past to day none stop , making patient nausea, dizziness ,taking ibuprofen ,  Feels someone is crushing her head and after feels tired     No allergies  [facility]

## 2023-01-24 ENCOUNTER — TELEPHONE (OUTPATIENT)
Dept: FAMILY MEDICINE CLINIC | Facility: CLINIC | Age: 23
End: 2023-01-24

## 2023-01-30 ENCOUNTER — OFFICE VISIT (OUTPATIENT)
Dept: FAMILY MEDICINE CLINIC | Facility: CLINIC | Age: 23
End: 2023-01-30

## 2023-01-30 VITALS
BODY MASS INDEX: 34.99 KG/M2 | SYSTOLIC BLOOD PRESSURE: 119 MMHG | DIASTOLIC BLOOD PRESSURE: 79 MMHG | WEIGHT: 210 LBS | OXYGEN SATURATION: 98 % | HEIGHT: 65 IN | TEMPERATURE: 98.6 F | HEART RATE: 94 BPM

## 2023-01-30 DIAGNOSIS — R53.83 OTHER FATIGUE: Primary | ICD-10-CM

## 2023-01-30 DIAGNOSIS — J32.9 CHRONIC SINUSITIS, UNSPECIFIED LOCATION: ICD-10-CM

## 2023-01-30 DIAGNOSIS — E23.6 HYPERPLASIA OF ANTERIOR PITUITARY (HCC): ICD-10-CM

## 2023-01-30 DIAGNOSIS — R68.89 SUSPECTED LYME DISEASE: ICD-10-CM

## 2023-01-30 DIAGNOSIS — M79.10 MYALGIA: ICD-10-CM

## 2023-01-30 RX ORDER — NORETHINDRONE ACETATE AND ETHINYL ESTRADIOL 1MG-20(21)
KIT ORAL EVERY 24 HOURS
COMMUNITY
Start: 2022-12-13

## 2023-01-30 RX ORDER — DOXYCYCLINE HYCLATE 100 MG
100 TABLET ORAL 2 TIMES DAILY
Qty: 42 TABLET | Refills: 0 | Status: SHIPPED | OUTPATIENT
Start: 2023-01-30 | End: 2023-02-20

## 2023-01-30 NOTE — PATIENT INSTRUCTIONS
Continue on allegra  Stop sudafed  Start mucinex (regular or extra strength)  Increase fluids  Nasal saline spray  Doxycycline as directed twice daily  Check labs for lyme and mono

## 2023-01-30 NOTE — PROGRESS NOTES
Name: Duncan Adan      : 2000      MRN: [id number]  Encounter Provider: MEKA Godinez  Encounter Date: 2023   Encounter department: [facility]    Assessment & Plan     1  Other fatigue  Assessment & Plan:  Check labs for lyme, mono    Orders:  -     doxycycline hyclate (VIBRA-TABS) 100 mg tablet; Take 1 tablet (100 mg total) by mouth 2 (two) times a day for 21 days  -     Mononucleosis screen; Future  -     Lyme Disease Serology w/Reflex; Future  -     C-reactive protein; Future  -     Mononucleosis screen  -     Lyme Disease Serology w/Reflex  -     C-reactive protein    2  Myalgia  -     doxycycline hyclate (VIBRA-TABS) 100 mg tablet; Take 1 tablet (100 mg total) by mouth 2 (two) times a day for 21 days  -     Mononucleosis screen; Future  -     Lyme Disease Serology w/Reflex; Future  -     C-reactive protein; Future  -     Mononucleosis screen  -     Lyme Disease Serology w/Reflex  -     C-reactive protein    3  Chronic sinusitis, unspecified location  Assessment & Plan:  Doxycycline bid x 14 days  Follow up with ENT    Orders:  -     doxycycline hyclate (VIBRA-TABS) 100 mg tablet; Take 1 tablet (100 mg total) by mouth 2 (two) times a day for 21 days    4  Suspected Lyme disease  Assessment & Plan:  Treating sinus infection w/ doxycycline if lyme is negative will stop at day 14 instead of completing 21 day course    Orders:  -     doxycycline hyclate (VIBRA-TABS) 100 mg tablet; Take 1 tablet (100 mg total) by mouth 2 (two) times a day for 21 days  -     Mononucleosis screen; Future  -     Lyme Disease Serology w/Reflex; Future  -     C-reactive protein; Future  -     Mononucleosis screen  -     Lyme Disease Serology w/Reflex  -     C-reactive protein    5   Hyperplasia of anterior pituitary [facility])  Assessment & Plan:  No changes on CT scan seen  Following with endocrinology           Subjective      Has been on and off sick for a few months with sinus issues-saw ENT a couple months ago was given abx and prednisone, felt symptoms came back went back on steroids and antibiotics for 3 weeks and still with congestion, fatigue  Last night started with chest pain worse with movement, feels better when she lays down  Not coughing a lot  Feels her glands are swollen for the past month and now larger  No fevers  But felt chills and sweats 2 days ago  Feels the fatigue has been for months  Has not had mono in past that she is aware of  Has been getting a lot of headaches and changes in vision  She is unable to recall the antibiotics she was on previously  Has CBC and BMP drawn while in hospital approx 2 weeks ago  Describes chest pain as tightness, right in the center of her chest  Hurts to take a deep breath in and out  Used her inhaler last night and sat in the cold last night which didn't seem to help last night  Doesn't typically need to use inhaler  Sometimes has dizziness with headaches, feels like motion sickness  Hx of sinus surgery approx 2 years ago  Was in ER on 1/13/2023 for headache CT head normal was given benadryl, reglan with improvement of symptoms  CT head showed: no acute abnormalities  The paranasal sinuses and mastoid antra are remarkable for mild chronic sinus inflammatory change in the left maxillary antrum unchanged from prior study    There is no evidence of significant erosion of the sella turcica or change in the appearance of the pituitary compared with prior MRI  Review of Systems   Constitutional: Positive for activity change, chills and fatigue  Negative for appetite change and fever  HENT: Positive for congestion, ear pain, postnasal drip, rhinorrhea, sinus pressure and sinus pain  Negative for sore throat and tinnitus  Eyes: Negative  Respiratory: Positive for cough and chest tightness  Negative for shortness of breath  Cardiovascular: Negative for chest pain and palpitations  Gastrointestinal: Negative      Genitourinary: Negative  Musculoskeletal: Positive for arthralgias  Neurological: Positive for headaches  Hematological: Negative  Current Outpatient Medications on File Prior to Visit   Medication Sig   • albuterol (PROVENTIL HFA,VENTOLIN HFA) 90 mcg/act inhaler Inhale 2 puffs   • albuterol CONTINUOUS nebulizing solution (canister)    • norethindrone-ethinyl estradiol (JUNEL FE 1/20) 1-20 MG-MCG per tablet every 24 hours   • norethindrone-ethinyl estradiol (JUNEL FE 1/20) 1-20 MG-MCG per tablet every 24 hours       Objective     /79   Pulse 94   Temp 98 6 °F (37 °C) (Tympanic)   Ht 5' 5" (1 651 m)   Wt 95 3 kg (210 lb)   SpO2 98%   BMI 34 95 kg/m²     Physical Exam  Vitals and nursing note reviewed  Constitutional:       Appearance: Normal appearance  She is obese  HENT:      Head: Normocephalic  Right Ear: Ear canal and external ear normal       Left Ear: Ear canal and external ear normal       Nose: Congestion and rhinorrhea present  Right Sinus: Maxillary sinus tenderness present  Left Sinus: Maxillary sinus tenderness present  Mouth/Throat:      Mouth: Mucous membranes are moist       Pharynx: Oropharynx is clear  Posterior oropharyngeal erythema present  Eyes:      Conjunctiva/sclera: Conjunctivae normal       Pupils: Pupils are equal, round, and reactive to light  Cardiovascular:      Rate and Rhythm: Normal rate and regular rhythm  Heart sounds: Normal heart sounds  No murmur heard  Pulmonary:      Effort: Pulmonary effort is normal  No respiratory distress  Breath sounds: No wheezing or rhonchi  Abdominal:      General: Abdomen is flat  Bowel sounds are normal       Palpations: Abdomen is soft  Musculoskeletal:      Right lower leg: No edema  Left lower leg: No edema  Lymphadenopathy:      Cervical: No cervical adenopathy  Skin:     General: Skin is warm and dry  Findings: No rash     Neurological:      Mental Status: She is alert and oriented to person, place, and time     Psychiatric:         Mood and Affect: Mood normal          Behavior: Behavior normal        Sherin Childs

## 2023-02-01 PROBLEM — R68.89 SUSPECTED LYME DISEASE: Status: ACTIVE | Noted: 2023-02-01

## 2023-02-01 PROBLEM — M79.10 MYALGIA: Status: ACTIVE | Noted: 2023-02-01

## 2023-02-01 PROBLEM — J32.9 CHRONIC SINUSITIS: Status: ACTIVE | Noted: 2023-02-01

## 2023-02-01 NOTE — ASSESSMENT & PLAN NOTE
Treating sinus infection w/ doxycycline if lyme is negative will stop at day 14 instead of completing 21 day course

## 2024-12-11 ENCOUNTER — APPOINTMENT (OUTPATIENT)
Age: 24
Setting detail: DERMATOLOGY
End: 2024-12-12

## 2024-12-11 DIAGNOSIS — L70.0 ACNE VULGARIS: ICD-10-CM

## 2024-12-11 PROCEDURE — OTHER PRESCRIPTION: OTHER

## 2024-12-11 PROCEDURE — OTHER COUNSELING: OTHER

## 2024-12-11 PROCEDURE — 99213 OFFICE O/P EST LOW 20 MIN: CPT

## 2024-12-11 PROCEDURE — OTHER PRESCRIPTION MEDICATION MANAGEMENT: OTHER

## 2024-12-11 RX ORDER — SPIRONOLACTONE 50 MG/1
TABLET, FILM COATED ORAL
Qty: 60 | Refills: 6 | Status: ERX | COMMUNITY
Start: 2024-12-11

## 2024-12-11 RX ORDER — CLINDAMYCIN PHOSPHATE/BENZOYL PEROXIDE/ADAPALENE 1.5; 31; 12 MG/G; MG/G; MG/G
GEL TOPICAL
Qty: 50 | Refills: 0 | Status: ERX | COMMUNITY
Start: 2024-12-11

## 2024-12-11 ASSESSMENT — LOCATION DETAILED DESCRIPTION DERM
LOCATION DETAILED: RIGHT INFERIOR CENTRAL MALAR CHEEK
LOCATION DETAILED: LEFT INFERIOR CENTRAL MALAR CHEEK

## 2024-12-11 ASSESSMENT — LOCATION SIMPLE DESCRIPTION DERM
LOCATION SIMPLE: LEFT CHEEK
LOCATION SIMPLE: RIGHT CHEEK

## 2024-12-11 ASSESSMENT — LOCATION ZONE DERM: LOCATION ZONE: FACE

## 2024-12-11 NOTE — PROCEDURE: COUNSELING
Minocycline Pregnancy And Lactation Text: This medication is Pregnancy Category D and not consider safe during pregnancy. It is also excreted in breast milk.
High Dose Vitamin A Pregnancy And Lactation Text: High dose vitamin A therapy is contraindicated during pregnancy and breast feeding.
Tazorac Counseling:  Patient advised that medication is irritating and drying.  Patient may need to apply sparingly and wash off after an hour before eventually leaving it on overnight.  The patient verbalized understanding of the proper use and possible adverse effects of tazorac.  All of the patient's questions and concerns were addressed.
Isotretinoin Pregnancy And Lactation Text: This medication is Pregnancy Category X and is considered extremely dangerous during pregnancy. It is unknown if it is excreted in breast milk.
Topical Retinoid counseling:  Patient advised to apply a pea-sized amount only at bedtime and wait 30 minutes after washing their face before applying.  If too drying, patient may add a non-comedogenic moisturizer. The patient verbalized understanding of the proper use and possible adverse effects of retinoids.  All of the patient's questions and concerns were addressed.
Detail Level: Zone
Spironolactone Pregnancy And Lactation Text: This medication can cause feminization of the male fetus and should be avoided during pregnancy. The active metabolite is also found in breast milk.
Bactrim Counseling:  I discussed with the patient the risks of sulfa antibiotics including but not limited to GI upset, allergic reaction, drug rash, diarrhea, dizziness, photosensitivity, and yeast infections.  Rarely, more serious reactions can occur including but not limited to aplastic anemia, agranulocytosis, methemoglobinemia, blood dyscrasias, liver or kidney failure, lung infiltrates or desquamative/blistering drug rashes.
Topical Sulfur Applications Counseling: Topical Sulfur Counseling: Patient counseled that this medication may cause skin irritation or allergic reactions.  In the event of skin irritation, the patient was advised to reduce the amount of the drug applied or use it less frequently.   The patient verbalized understanding of the proper use and possible adverse effects of topical sulfur application.  All of the patient's questions and concerns were addressed.
Azithromycin Counseling:  I discussed with the patient the risks of azithromycin including but not limited to GI upset, allergic reaction, drug rash, diarrhea, and yeast infections.
Topical Clindamycin Counseling: Patient counseled that this medication may cause skin irritation or allergic reactions.  In the event of skin irritation, the patient was advised to reduce the amount of the drug applied or use it less frequently.   The patient verbalized understanding of the proper use and possible adverse effects of clindamycin.  All of the patient's questions and concerns were addressed.
Tetracycline Counseling: Patient counseled regarding possible photosensitivity and increased risk for sunburn.  Patient instructed to avoid sunlight, if possible.  When exposed to sunlight, patients should wear protective clothing, sunglasses, and sunscreen.  The patient was instructed to call the office immediately if the following severe adverse effects occur:  hearing changes, easy bruising/bleeding, severe headache, or vision changes.  The patient verbalized understanding of the proper use and possible adverse effects of tetracycline.  All of the patient's questions and concerns were addressed. Patient understands to avoid pregnancy while on therapy due to potential birth defects.
Dapsone Pregnancy And Lactation Text: This medication is Pregnancy Category C and is not considered safe during pregnancy or breast feeding.
Topical Clindamycin Pregnancy And Lactation Text: This medication is Pregnancy Category B and is considered safe during pregnancy. It is unknown if it is excreted in breast milk.
Spironolactone Counseling: Patient advised regarding risks of diarrhea, abdominal pain, hyperkalemia, birth defects (for female patients), liver toxicity and renal toxicity. The patient may need blood work to monitor liver and kidney function and potassium levels while on therapy. The patient verbalized understanding of the proper use and possible adverse effects of spironolactone.  All of the patient's questions and concerns were addressed.
Birth Control Pills Pregnancy And Lactation Text: This medication should be avoided if pregnant and for the first 30 days post-partum.
Topical Sulfur Applications Pregnancy And Lactation Text: This medication is Pregnancy Category C and has an unknown safety profile during pregnancy. It is unknown if this topical medication is excreted in breast milk.
Erythromycin Pregnancy And Lactation Text: This medication is Pregnancy Category B and is considered safe during pregnancy. It is also excreted in breast milk.
Benzoyl Peroxide Counseling: Patient counseled that medicine may cause skin irritation and bleach clothing.  In the event of skin irritation, the patient was advised to reduce the amount of the drug applied or use it less frequently.   The patient verbalized understanding of the proper use and possible adverse effects of benzoyl peroxide.  All of the patient's questions and concerns were addressed.
Azelaic Acid Counseling: Patient counseled that medicine may cause skin irritation and to avoid applying near the eyes.  In the event of skin irritation, the patient was advised to reduce the amount of the drug applied or use it less frequently.   The patient verbalized understanding of the proper use and possible adverse effects of azelaic acid.  All of the patient's questions and concerns were addressed.
Winlevi Pregnancy And Lactation Text: This medication is considered safe during pregnancy and breastfeeding.
Doxycycline Pregnancy And Lactation Text: This medication is Pregnancy Category D and not consider safe during pregnancy. It is also excreted in breast milk but is considered safe for shorter treatment courses.
Aklief counseling:  Patient advised to apply a pea-sized amount only at bedtime and wait 30 minutes after washing their face before applying.  If too drying, patient may add a non-comedogenic moisturizer.  The most commonly reported side effects including irritation, redness, scaling, dryness, stinging, burning, itching, and increased risk of sunburn.  The patient verbalized understanding of the proper use and possible adverse effects of retinoids.  All of the patient's questions and concerns were addressed.
Minocycline Counseling: Patient advised regarding possible photosensitivity and discoloration of the teeth, skin, lips, tongue and gums.  Patient instructed to avoid sunlight, if possible.  When exposed to sunlight, patients should wear protective clothing, sunglasses, and sunscreen.  The patient was instructed to call the office immediately if the following severe adverse effects occur:  hearing changes, easy bruising/bleeding, severe headache, or vision changes.  The patient verbalized understanding of the proper use and possible adverse effects of minocycline.  All of the patient's questions and concerns were addressed.
Topical Retinoid Pregnancy And Lactation Text: This medication is Pregnancy Category C. It is unknown if this medication is excreted in breast milk.
High Dose Vitamin A Counseling: Side effects reviewed, pt to contact office should one occur.
Benzoyl Peroxide Pregnancy And Lactation Text: This medication is Pregnancy Category C. It is unknown if benzoyl peroxide is excreted in breast milk.
Tazorac Pregnancy And Lactation Text: This medication is not safe during pregnancy. It is unknown if this medication is excreted in breast milk.
Azithromycin Pregnancy And Lactation Text: This medication is considered safe during pregnancy and is also secreted in breast milk.
Bactrim Pregnancy And Lactation Text: This medication is Pregnancy Category D and is known to cause fetal risk.  It is also excreted in breast milk.
Use Enhanced Medication Counseling?: No
Sarecycline Counseling: Patient advised regarding possible photosensitivity and discoloration of the teeth, skin, lips, tongue and gums.  Patient instructed to avoid sunlight, if possible.  When exposed to sunlight, patients should wear protective clothing, sunglasses, and sunscreen.  The patient was instructed to call the office immediately if the following severe adverse effects occur:  hearing changes, easy bruising/bleeding, severe headache, or vision changes.  The patient verbalized understanding of the proper use and possible adverse effects of sarecycline.  All of the patient's questions and concerns were addressed.
Doxycycline Counseling:  Patient counseled regarding possible photosensitivity and increased risk for sunburn.  Patient instructed to avoid sunlight, if possible.  When exposed to sunlight, patients should wear protective clothing, sunglasses, and sunscreen.  The patient was instructed to call the office immediately if the following severe adverse effects occur:  hearing changes, easy bruising/bleeding, severe headache, or vision changes.  The patient verbalized understanding of the proper use and possible adverse effects of doxycycline.  All of the patient's questions and concerns were addressed.
Dapsone Counseling: I discussed with the patient the risks of dapsone including but not limited to hemolytic anemia, agranulocytosis, rashes, methemoglobinemia, kidney failure, peripheral neuropathy, headaches, GI upset, and liver toxicity.  Patients who start dapsone require monitoring including baseline LFTs and weekly CBCs for the first month, then every month thereafter.  The patient verbalized understanding of the proper use and possible adverse effects of dapsone.  All of the patient's questions and concerns were addressed.
Birth Control Pills Counseling: Birth Control Pill Counseling: I discussed with the patient the potential side effects of OCPs including but not limited to increased risk of stroke, heart attack, thrombophlebitis, deep venous thrombosis, hepatic adenomas, breast changes, GI upset, headaches, and depression.  The patient verbalized understanding of the proper use and possible adverse effects of OCPs. All of the patient's questions and concerns were addressed.
Winlevi Counseling:  I discussed with the patient the risks of topical clascoterone including but not limited to erythema, scaling, itching, and stinging. Patient voiced their understanding.
Isotretinoin Counseling: Patient should get monthly blood tests, not donate blood, not drive at night if vision affected, not share medication, and not undergo elective surgery for 6 months after tx completed. Side effects reviewed, pt to contact office should one occur.
Azelaic Acid Pregnancy And Lactation Text: This medication is considered safe during pregnancy and breast feeding.
Aklief Pregnancy And Lactation Text: It is unknown if this medication is safe to use during pregnancy.  It is unknown if this medication is excreted in breast milk.  Breastfeeding women should use the topical cream on the smallest area of the skin for the shortest time needed while breastfeeding.  Do not apply to nipple and areola.
Erythromycin Counseling:  I discussed with the patient the risks of erythromycin including but not limited to GI upset, allergic reaction, drug rash, diarrhea, increase in liver enzymes, and yeast infections.

## 2025-02-05 RX ORDER — SPIRONOLACTONE 50 MG/1
TABLET, FILM COATED ORAL
Qty: 180 | Refills: 1 | Status: ERX